# Patient Record
Sex: FEMALE | Race: WHITE | NOT HISPANIC OR LATINO | Employment: FULL TIME | URBAN - METROPOLITAN AREA
[De-identification: names, ages, dates, MRNs, and addresses within clinical notes are randomized per-mention and may not be internally consistent; named-entity substitution may affect disease eponyms.]

---

## 2021-07-07 ENCOUNTER — OFFICE VISIT (OUTPATIENT)
Dept: FAMILY MEDICINE | Facility: CLINIC | Age: 21
End: 2021-07-07
Payer: COMMERCIAL

## 2021-07-07 VITALS
OXYGEN SATURATION: 97 % | BODY MASS INDEX: 41.95 KG/M2 | HEIGHT: 70 IN | TEMPERATURE: 99 F | DIASTOLIC BLOOD PRESSURE: 80 MMHG | HEART RATE: 79 BPM | WEIGHT: 293 LBS | SYSTOLIC BLOOD PRESSURE: 130 MMHG | RESPIRATION RATE: 24 BRPM

## 2021-07-07 DIAGNOSIS — Z00.00 ROUTINE GENERAL MEDICAL EXAMINATION AT A HEALTH CARE FACILITY: Primary | ICD-10-CM

## 2021-07-07 DIAGNOSIS — E66.01 MORBID OBESITY (H): ICD-10-CM

## 2021-07-07 DIAGNOSIS — F41.1 GAD (GENERALIZED ANXIETY DISORDER): ICD-10-CM

## 2021-07-07 DIAGNOSIS — Z30.015 ENCOUNTER FOR INITIAL PRESCRIPTION OF VAGINAL RING HORMONAL CONTRACEPTIVE: ICD-10-CM

## 2021-07-07 DIAGNOSIS — Z12.4 CERVICAL CANCER SCREENING: ICD-10-CM

## 2021-07-07 LAB — HCG UR QL: NEGATIVE

## 2021-07-07 PROCEDURE — 87491 CHLMYD TRACH DNA AMP PROBE: CPT | Performed by: NURSE PRACTITIONER

## 2021-07-07 PROCEDURE — G0145 SCR C/V CYTO,THINLAYER,RESCR: HCPCS | Performed by: NURSE PRACTITIONER

## 2021-07-07 PROCEDURE — 87591 N.GONORRHOEAE DNA AMP PROB: CPT | Performed by: NURSE PRACTITIONER

## 2021-07-07 PROCEDURE — 99385 PREV VISIT NEW AGE 18-39: CPT | Performed by: NURSE PRACTITIONER

## 2021-07-07 PROCEDURE — 99214 OFFICE O/P EST MOD 30 MIN: CPT | Mod: 25 | Performed by: NURSE PRACTITIONER

## 2021-07-07 PROCEDURE — 81025 URINE PREGNANCY TEST: CPT | Performed by: NURSE PRACTITIONER

## 2021-07-07 RX ORDER — ETONOGESTREL AND ETHINYL ESTRADIOL VAGINAL RING .015; .12 MG/D; MG/D
1 RING VAGINAL
Qty: 3 EACH | Refills: 1 | Status: SHIPPED | OUTPATIENT
Start: 2021-07-07 | End: 2021-12-06

## 2021-07-07 RX ORDER — HYDROXYZINE HYDROCHLORIDE 25 MG/1
25-50 TABLET, FILM COATED ORAL 3 TIMES DAILY PRN
Qty: 30 TABLET | Refills: 0 | Status: SHIPPED | OUTPATIENT
Start: 2021-07-07 | End: 2022-11-09

## 2021-07-07 ASSESSMENT — ENCOUNTER SYMPTOMS
FREQUENCY: 0
EYE PAIN: 0
WEAKNESS: 0
COUGH: 0
SHORTNESS OF BREATH: 0
NERVOUS/ANXIOUS: 1
HEMATURIA: 0
FEVER: 0
BREAST MASS: 0
HEADACHES: 0
SORE THROAT: 0
JOINT SWELLING: 0
DYSURIA: 0
DIZZINESS: 0
DIARRHEA: 0
CHILLS: 0
ARTHRALGIAS: 0
CONSTIPATION: 0
HEARTBURN: 0
NAUSEA: 0
PARESTHESIAS: 0
MYALGIAS: 0
ABDOMINAL PAIN: 0
HEMATOCHEZIA: 0
PALPITATIONS: 0

## 2021-07-07 ASSESSMENT — MIFFLIN-ST. JEOR: SCORE: 2284.6

## 2021-07-07 NOTE — PROGRESS NOTES
SUBJECTIVE:   CC: Melany Ibrahim is an 21 year old woman who presents for preventive health visit.       Patient has been advised of split billing requirements and indicates understanding: Yes  Healthy Habits:     Getting at least 3 servings of Calcium per day:  NO    Bi-annual eye exam:  NO    Dental care twice a year:  NO    Sleep apnea or symptoms of sleep apnea:  None    Diet:  Other    Frequency of exercise:  4-5 days/week    Duration of exercise:  30-45 minutes    Taking medications regularly:  Yes    Medication side effects:  None    PHQ-2 Total Score: 1    Additional concerns today:  No    Contraception             Problems with current birth control method: N/A  Method interested in: Nuvo Ring              Methods used previously: ring  Problems with previous methods: no    History of pregnancies:         Patient's last menstrual period was 2021 (approximate).         No results found for: PAP  :    Para:    Menstrual cycle: regular  Flow: light        Breakthrough bleeding occurring? 0 What week of her month? 0  History of migraines: no  Smoker: no  1st degree relative with History of: 0                 Regular self breast exam: no    Accompanying Signs & Symptoms:   Dysuria: no  Vaginal discharge: no  Painful intercourse: no    Precipitating and/or Alleviating factors:    Currently sexually active: YES  Male, Female, both: male  In stable relationship: YES  Desire STD testing: no  Are you planning a pregnancy soon: no                 Body mass index is 46.3 kg/m .  Consider Labs: UPT, STD: Chlamydia/GONORRHEA screen, Pap    Was on Nuvaring and stopped about 6 months ago - didn't get refilled         Today's PHQ-2 Score:   PHQ-2 (  [employer]) 2021   Q1: Little interest or pleasure in doing things 0   Q2: Feeling down, depressed or hopeless 1   PHQ-2 Score 1   Q1: Little interest or pleasure in doing things Not at all   Q2: Feeling down, depressed or hopeless Several days   PHQ-2  Score 1       Anxiety   Noticed more in the last year   Had a lot of change all at once prior to anxiety   Has tried working out, journaling   Usually builds up slowly and then has had panic attack, has had twice  Does not control life or inhibits daily activities       Abuse: Current or Past (Physical, Sexual or Emotional) - No  Do you feel safe in your environment? Yes        Social History     Tobacco Use     Smoking status: Never Smoker     Smokeless tobacco: Never Used   Substance Use Topics     Alcohol use: Yes     Comment: 4 drinks per week     If you drink alcohol do you typically have >3 drinks per day or >7 drinks per week? No    Alcohol Use 7/7/2021   Prescreen: >3 drinks/day or >7 drinks/week? No   Prescreen: >3 drinks/day or >7 drinks/week? -       Reviewed orders with patient.  Reviewed health maintenance and updated orders accordingly - Yes  Lab work is in process  Labs reviewed in EPIC  BP Readings from Last 3 Encounters:   07/07/21 130/80    Wt Readings from Last 3 Encounters:   07/07/21 145.3 kg (320 lb 6.4 oz)                  Patient Active Problem List   Diagnosis     Morbid obesity (H)     History reviewed. No pertinent surgical history.    Social History     Tobacco Use     Smoking status: Never Smoker     Smokeless tobacco: Never Used   Substance Use Topics     Alcohol use: Yes     Comment: 4 drinks per week     Family History   Problem Relation Age of Onset     Diabetes Maternal Grandfather      Diabetes Paternal Grandfather          Current Outpatient Medications   Medication Sig Dispense Refill     etonogestrel-ethinyl estradiol (NUVARING) 0.12-0.015 MG/24HR vaginal ring Place 1 each vaginally every 28 days 3 each 1     hydrOXYzine (ATARAX) 25 MG tablet Take 1-2 tablets (25-50 mg) by mouth 3 times daily as needed for anxiety 30 tablet 0     No Known Allergies    Breast Cancer Screening:        History of abnormal Pap smear: NO - age 21-29 PAP every 3 years recommended     Reviewed and  "updated as needed this visit by clinical staff  Tobacco  Allergies  Meds  Problems  Med Hx  Surg Hx  Fam Hx  Soc Hx          Reviewed and updated as needed this visit by Provider    Meds  Problems            History reviewed. No pertinent past medical history.   History reviewed. No pertinent surgical history.    Review of Systems   Constitutional: Negative for chills and fever.   HENT: Negative for congestion, ear pain, hearing loss and sore throat.    Eyes: Negative for pain and visual disturbance.   Respiratory: Negative for cough and shortness of breath.    Cardiovascular: Negative for chest pain, palpitations and peripheral edema.   Gastrointestinal: Negative for abdominal pain, constipation, diarrhea, heartburn, hematochezia and nausea.   Breasts:  Negative for tenderness, breast mass and discharge.   Genitourinary: Negative for dysuria, frequency, genital sores, hematuria, pelvic pain, urgency, vaginal bleeding and vaginal discharge.   Musculoskeletal: Negative for arthralgias, joint swelling and myalgias.   Skin: Negative for rash.   Neurological: Negative for dizziness, weakness, headaches and paresthesias.   Psychiatric/Behavioral: Negative for mood changes. The patient is nervous/anxious.        OBJECTIVE:   /80   Pulse 79   Temp 99  F (37.2  C)   Resp 24   Ht 1.772 m (5' 9.75\")   Wt 145.3 kg (320 lb 6.4 oz)   LMP 06/28/2021 (Approximate)   SpO2 97%   Breastfeeding No   BMI 46.30 kg/m    Physical Exam  GENERAL: healthy, alert and no distress  EYES: Eyes grossly normal to inspection, PERRL and conjunctivae and sclerae normal  HENT: ear canals and TM's normal, nose and mouth without ulcers or lesions  NECK: no adenopathy, no asymmetry, masses, or scars and thyroid normal to palpation  RESP: lungs clear to auscultation - no rales, rhonchi or wheezes  BREAST: normal without masses, tenderness or nipple discharge and no palpable axillary masses or adenopathy  CV: regular rate and " rhythm, normal S1 S2, no S3 or S4, no murmur, click or rub, no peripheral edema and peripheral pulses strong  ABDOMEN: soft, nontender, no hepatosplenomegaly, no masses and bowel sounds normal   (female): normal female external genitalia, normal urethral meatus, vaginal mucosa pink, moist, well rugated, and normal cervix/adnexa/uterus without masses or discharge  MS: no gross musculoskeletal defects noted, no edema  SKIN: no suspicious lesions or rashes  NEURO: Normal strength and tone, mentation intact and speech normal  PSYCH: mentation appears normal, affect normal/bright    Diagnostic Test Results:  Pending     ASSESSMENT/PLAN:   1. Routine general medical examination at a health care facility    - NEISSERIA GONORRHOEA PCR  - CHLAMYDIA TRACHOMATIS PCR    2. Encounter for initial prescription of vaginal ring hormonal contraceptive  Has been on NuvaRing in the past, tolerated well and would like to go back on.  Urine pregnancy negative.  NuvaRing reordered, follow-up in 6 months.  - HCG Qual, Urine ([id number])  - etonogestrel-ethinyl estradiol (NUVARING) 0.12-0.015 MG/24HR vaginal ring; Place 1 each vaginally every 28 days  Dispense: 3 each; Refill: 1    3. Morbid obesity (H)  Chronic, patient currently working with nutritionist and meal planning.  Encouraged to continue to increase daily physical activity as tolerated.    4. ARIEL (generalized anxiety disorder)  Acute on chronic, has been worsening over the last year.  Had a lot of significant changes all at once, improving situation, however still having anxiety.  Discussed counseling, patient would like to pursue.  Referral placed, should be receiving a phone call to schedule.  Other counseling resources attached.  Also discussed hydroxyzine as needed for anxiety, can try 1 to 2 tablets as needed for panic or anxiety.  Advised not to take if driving or operating heavy machinery.  He can take at night as needed if having difficulty sleeping.  - hydrOXYzine  "(ATARAX) 25 MG tablet; Take 1-2 tablets (25-50 mg) by mouth 3 times daily as needed for anxiety  Dispense: 30 tablet; Refill: 0  - MENTAL HEALTH REFERRAL  - Adult; Outpatient Treatment; Individual/Couples/Family/Group Therapy/Health Psychology; [facility] [place]: [facility] [phone]; We will contact you to schedule the appointment or please call with any questions    5. Cervical cancer screening  Screening.  - Pap imaged thin layer screen only - recommended age 21 - 24 years    Patient has been advised of split billing requirements and indicates understanding: Yes  COUNSELING:  Reviewed preventive health counseling, as reflected in patient instructions    Estimated body mass index is 46.3 kg/m  as calculated from the following:    Height as of this encounter: 1.772 m (5' 9.75\").    Weight as of this encounter: 145.3 kg (320 lb 6.4 oz).    Weight management plan: Discussed healthy diet and exercise guidelines    She reports that she has never smoked. She has never used smokeless tobacco.      Counseling Resources:  ATP IV Guidelines  Pooled Cohorts Equation Calculator  Breast Cancer Risk Calculator  BRCA-Related Cancer Risk Assessment: FHS-7 Tool  FRAX Risk Assessment  ICSI Preventive Guidelines  Dietary Guidelines for Americans, 2010  USDA's MyPlate  ASA Prophylaxis  Lung CA Screening    MARIELENA [facility]  "

## 2021-07-07 NOTE — PATIENT INSTRUCTIONS
1. Routine general medical examination at a health care facility    - NEISSERIA GONORRHOEA PCR  - CHLAMYDIA TRACHOMATIS PCR    2. Encounter for initial prescription of vaginal ring hormonal contraceptive  Has been on NuvaRing in the past, tolerated well and would like to go back on.  Urine pregnancy negative.  NuvaRing reordered, follow-up in 6 months.  - HCG Qual, Urine ([id number])  - etonogestrel-ethinyl estradiol (NUVARING) 0.12-0.015 MG/24HR vaginal ring; Place 1 each vaginally every 28 days  Dispense: 3 each; Refill: 1    3. Morbid obesity (H)  Chronic, patient currently working with nutritionist and meal planning.  Encouraged to continue to increase daily physical activity as tolerated.    4. ARIEL (generalized anxiety disorder)  Acute on chronic, has been worsening over the last year.  Had a lot of significant changes all at once, improving situation, however still having anxiety.  Discussed counseling, patient would like to pursue.  Referral placed, should be receiving a phone call to schedule.  Other counseling resources attached.  Also discussed hydroxyzine as needed for anxiety, can try 1 to 2 tablets as needed for panic or anxiety.  Advised not to take if driving or operating heavy machinery.  He can take at night as needed if having difficulty sleeping.  - hydrOXYzine (ATARAX) 25 MG tablet; Take 1-2 tablets (25-50 mg) by mouth 3 times daily as needed for anxiety  Dispense: 30 tablet; Refill: 0  - MENTAL HEALTH REFERRAL  - Adult; Outpatient Treatment; Individual/Couples/Family/Group Therapy/Health Psychology; [facility] [place] [facility] [phone]; We will contact you to schedule the appointment or please call with any questions    5. Cervical cancer screening  Screening.  - Pap imaged thin layer screen only - recommended age 21 - 24 years    Other counseling resources  [facility] ([place])-- [phone]  [facility] ([place]) -- [phone]   of [place] ([facility])  -- [phone]  [facility] ([place]) -- [phone]  [facility] ([place], other cities as well) -- [phone]  [facility] ([place]) -- [phone]  [facility] ([place], multiple locations) -- [phone]  [facility] ([facility], multiple locations) -- [phone]       Preventive Health Recommendations  Female Ages 21 to 25     Yearly exam:     See your health care provider every year in order to  o Review health changes.   o Discuss preventive care.    o Review your medicines if your doctor has prescribed any.      You should be tested each year for STDs (sexually transmitted diseases).       Talk to your provider about how often you should have cholesterol testing.      Get a Pap test every three years. If you have an abnormal result, your doctor may have you test more often.      If you are at risk for diabetes, you should have a diabetes test (fasting glucose).     Shots:     Get a flu shot each year.     Get a tetanus shot every 10 years.     Consider getting the shot (vaccine) that prevents cervical cancer (Gardasil).    Nutrition:     Eat at least 5 servings of fruits and vegetables each day.    Eat whole-grain bread, whole-wheat pasta and brown rice instead of white grains and rice.    Get adequate Calcium and Vitamin D.     Lifestyle    Exercise at least 150 minutes a week each week (30 minutes a day, 5 days a week). This will help you control your weight and prevent disease.    Limit alcohol to one drink per day.    No smoking.     Wear sunscreen to prevent skin cancer.    See your dentist every six months for an exam and cleaning.

## 2021-07-08 LAB
C TRACH DNA SPEC QL NAA+PROBE: NEGATIVE
N GONORRHOEA DNA SPEC QL NAA+PROBE: NEGATIVE
SPECIMEN SOURCE: NORMAL
SPECIMEN SOURCE: NORMAL

## 2021-07-09 LAB
COPATH REPORT: NORMAL
PAP: NORMAL

## 2021-09-02 ENCOUNTER — OFFICE VISIT (OUTPATIENT)
Dept: FAMILY MEDICINE | Facility: CLINIC | Age: 21
End: 2021-09-02
Payer: COMMERCIAL

## 2021-09-02 VITALS
BODY MASS INDEX: 47.52 KG/M2 | DIASTOLIC BLOOD PRESSURE: 80 MMHG | SYSTOLIC BLOOD PRESSURE: 120 MMHG | WEIGHT: 293 LBS | RESPIRATION RATE: 24 BRPM | HEART RATE: 110 BPM | TEMPERATURE: 99.1 F | OXYGEN SATURATION: 97 %

## 2021-09-02 DIAGNOSIS — L98.9 SKIN LESION: Primary | ICD-10-CM

## 2021-09-02 PROCEDURE — 99213 OFFICE O/P EST LOW 20 MIN: CPT | Performed by: FAMILY MEDICINE

## 2021-09-02 NOTE — PROGRESS NOTES
Assessment & Plan     Skin lesion  Differentials discussed in detail including benign etiology.  Dermatology referral placed for considering excision.  All questions answered.  - Adult Dermatology Referral; Future      Jose Holbrook MD  [facility]    Lexus Mosley is a 21 year old who presents for the following health issues     HPI       Derm problem      Duration: 3 months, growing in size    Description (location/character/radiation): left arm    Intensity:  mild    Accompanying signs and symptoms: small bump growing in size    History (similar episodes/previous evaluation): None    Precipitating or alleviating factors: None    Therapies tried and outcome: None         Review of Systems   Constitutional, HEENT, cardiovascular, pulmonary, gi and gu systems are negative, except as otherwise noted.      Objective    /80   Pulse 110   Temp 99.1  F (37.3  C)   Resp 24   Wt 149.1 kg (328 lb 12.8 oz)   LMP  (LMP Unknown)   SpO2 97%   Breastfeeding No   BMI 47.52 kg/m    Body mass index is 47.52 kg/m .  Physical Exam   GENERAL: alert and no distress  MS: no gross musculoskeletal defects noted, no edema  SKIN: small subcutaneous localized swelling involving left forearm, firm on palpation, nontender without any erythema or warmth  NEURO: Normal strength and tone, mentation intact and speech normal  PSYCH: mentation appears normal, affect normal/bright

## 2021-10-11 ENCOUNTER — TELEPHONE (OUTPATIENT)
Dept: PSYCHOLOGY | Facility: CLINIC | Age: 21
End: 2021-10-11

## 2021-10-11 NOTE — TELEPHONE ENCOUNTER
Therapist attempted to contact pt to inquire about where she would like the link sent for video visit, but she did not answer. Therapist sent link to email and phone and [facility] for pt informing her of this and letting her know the video would remain available for another 10 minutes. Therapist provided [employer] phone number for any questions or inquiries regarding her visit.    Lupe Brown, LICSW 10/11/2021

## 2021-12-05 DIAGNOSIS — Z30.015 ENCOUNTER FOR INITIAL PRESCRIPTION OF VAGINAL RING HORMONAL CONTRACEPTIVE: ICD-10-CM

## 2021-12-06 RX ORDER — ETONOGESTREL AND ETHINYL ESTRADIOL VAGINAL RING .015; .12 MG/D; MG/D
1 RING VAGINAL
Qty: 3 EACH | Refills: 1 | Status: SHIPPED | OUTPATIENT
Start: 2021-12-06 | End: 2022-06-01

## 2022-01-03 ENCOUNTER — OFFICE VISIT (OUTPATIENT)
Dept: DERMATOLOGY | Facility: CLINIC | Age: 22
End: 2022-01-03
Attending: FAMILY MEDICINE
Payer: COMMERCIAL

## 2022-01-03 VITALS — OXYGEN SATURATION: 98 % | HEART RATE: 103 BPM | DIASTOLIC BLOOD PRESSURE: 80 MMHG | SYSTOLIC BLOOD PRESSURE: 156 MMHG

## 2022-01-03 DIAGNOSIS — L72.9 CYST OF SKIN: Primary | ICD-10-CM

## 2022-01-03 PROCEDURE — 99202 OFFICE O/P NEW SF 15 MIN: CPT | Performed by: PHYSICIAN ASSISTANT

## 2022-01-03 NOTE — PROGRESS NOTES
HPI:   Chief complaints: Melany Ibrahim is a pleasant 21 year old female who presents for evaluation of a bump on the left arm. The bump has been present for a few months and will sometimes be bothersome. It's not painful at this point.       PHYSICAL EXAM:    BP (!) 156/80   Pulse 103   SpO2 98%   Skin exam performed as follows: Type 2 skin. Mood appropriate  Alert and Oriented X 3. Well developed, well nourished in no distress.  General appearance: Normal  Head including face: Normal  Eyes: conjunctiva and lids: Normal  Mouth: Lips, teeth, gums: Normal  Neck: Normal  Cardiovascular: Exam of peripheral vascular system by observation for swelling, varicosities, edema: Normal  Right upper extremity: Normal  Left upper extremity: Normal  Right lower extremity: Normal  Left lower extremity: Normal  Skin: Scalp and body hair: See below    6 mm smooth mobile subcutaneous nodule on the left extensor forearm    ASSESSMENT/PLAN:     1. Cyst on the left forearm. Discussed excision with Dr Sykes if it becomes bothersome.   2. Melany to follow up with Primary Care provider regarding elevated blood pressure.        Follow-up: PRN  CC:   Scribed By: Shavon Britton, MS, PA-C

## 2022-01-03 NOTE — LETTER
1/3/2022         RE: Melany Ibrahim  [address] [place] [address] [place] [address]        Dear Colleague,    Thank you for referring your patient, Melany Ibrahim, to the [facility]. Please see a copy of my visit note below.    HPI:   Chief complaints: Melany Ibrahim is a pleasant 21 year old female who presents for evaluation of a bump on the left arm. The bump has been present for a few months and will sometimes be bothersome. It's not painful at this point.       PHYSICAL EXAM:    BP (!) 156/80   Pulse 103   SpO2 98%   Skin exam performed as follows: Type 2 skin. Mood appropriate  Alert and Oriented X 3. Well developed, well nourished in no distress.  General appearance: Normal  Head including face: Normal  Eyes: conjunctiva and lids: Normal  Mouth: Lips, teeth, gums: Normal  Neck: Normal  Cardiovascular: Exam of peripheral vascular system by observation for swelling, varicosities, edema: Normal  Right upper extremity: Normal  Left upper extremity: Normal  Right lower extremity: Normal  Left lower extremity: Normal  Skin: Scalp and body hair: See below    6 mm smooth mobile subcutaneous nodule on the left extensor forearm    ASSESSMENT/PLAN:     1. Cyst on the left forearm. Discussed excision with Dr Sykes if it becomes bothersome.   2. Melany to follow up with Primary Care provider regarding elevated blood pressure.        Follow-up: PRN  CC:   Scribed By: Shavon Abdul, MS, PAPARAM          Again, thank you for allowing me to participate in the care of your patient.        Sincerely,        Shavon Abdul PA-C

## 2022-02-06 ENCOUNTER — HEALTH MAINTENANCE LETTER (OUTPATIENT)
Age: 22
End: 2022-02-06

## 2022-05-30 DIAGNOSIS — Z30.015 ENCOUNTER FOR INITIAL PRESCRIPTION OF VAGINAL RING HORMONAL CONTRACEPTIVE: ICD-10-CM

## 2022-06-01 RX ORDER — ETONOGESTREL AND ETHINYL ESTRADIOL .12; .015 MG/D; MG/D
RING VAGINAL
Qty: 1 EACH | Refills: 1 | Status: SHIPPED | OUTPATIENT
Start: 2022-06-01 | End: 2022-11-09

## 2022-10-03 ENCOUNTER — HEALTH MAINTENANCE LETTER (OUTPATIENT)
Age: 22
End: 2022-10-03

## 2022-11-02 ASSESSMENT — ANXIETY QUESTIONNAIRES
GAD7 TOTAL SCORE: 11
GAD7 TOTAL SCORE: 11
8. IF YOU CHECKED OFF ANY PROBLEMS, HOW DIFFICULT HAVE THESE MADE IT FOR YOU TO DO YOUR WORK, TAKE CARE OF THINGS AT HOME, OR GET ALONG WITH OTHER PEOPLE?: SOMEWHAT DIFFICULT
6. BECOMING EASILY ANNOYED OR IRRITABLE: NEARLY EVERY DAY
GAD7 TOTAL SCORE: 11
3. WORRYING TOO MUCH ABOUT DIFFERENT THINGS: MORE THAN HALF THE DAYS
IF YOU CHECKED OFF ANY PROBLEMS ON THIS QUESTIONNAIRE, HOW DIFFICULT HAVE THESE PROBLEMS MADE IT FOR YOU TO DO YOUR WORK, TAKE CARE OF THINGS AT HOME, OR GET ALONG WITH OTHER PEOPLE: SOMEWHAT DIFFICULT
4. TROUBLE RELAXING: NOT AT ALL
5. BEING SO RESTLESS THAT IT IS HARD TO SIT STILL: NOT AT ALL
1. FEELING NERVOUS, ANXIOUS, OR ON EDGE: MORE THAN HALF THE DAYS
7. FEELING AFRAID AS IF SOMETHING AWFUL MIGHT HAPPEN: MORE THAN HALF THE DAYS
7. FEELING AFRAID AS IF SOMETHING AWFUL MIGHT HAPPEN: MORE THAN HALF THE DAYS
2. NOT BEING ABLE TO STOP OR CONTROL WORRYING: MORE THAN HALF THE DAYS

## 2022-11-02 ASSESSMENT — PATIENT HEALTH QUESTIONNAIRE - PHQ9
10. IF YOU CHECKED OFF ANY PROBLEMS, HOW DIFFICULT HAVE THESE PROBLEMS MADE IT FOR YOU TO DO YOUR WORK, TAKE CARE OF THINGS AT HOME, OR GET ALONG WITH OTHER PEOPLE: SOMEWHAT DIFFICULT
SUM OF ALL RESPONSES TO PHQ QUESTIONS 1-9: 11
SUM OF ALL RESPONSES TO PHQ QUESTIONS 1-9: 11

## 2022-11-09 ENCOUNTER — OFFICE VISIT (OUTPATIENT)
Dept: FAMILY MEDICINE | Facility: CLINIC | Age: 22
End: 2022-11-09
Payer: COMMERCIAL

## 2022-11-09 VITALS
WEIGHT: 293 LBS | TEMPERATURE: 98.5 F | HEIGHT: 70 IN | RESPIRATION RATE: 22 BRPM | BODY MASS INDEX: 41.95 KG/M2 | HEART RATE: 106 BPM | SYSTOLIC BLOOD PRESSURE: 150 MMHG | DIASTOLIC BLOOD PRESSURE: 68 MMHG | OXYGEN SATURATION: 98 %

## 2022-11-09 DIAGNOSIS — F41.1 GAD (GENERALIZED ANXIETY DISORDER): ICD-10-CM

## 2022-11-09 DIAGNOSIS — E66.01 MORBID OBESITY (H): Primary | ICD-10-CM

## 2022-11-09 DIAGNOSIS — E66.01 MORBID OBESITY (H): ICD-10-CM

## 2022-11-09 PROCEDURE — 99214 OFFICE O/P EST MOD 30 MIN: CPT | Performed by: FAMILY MEDICINE

## 2022-11-09 RX ORDER — FLUOXETINE 10 MG/1
10 CAPSULE ORAL DAILY
Qty: 60 CAPSULE | Refills: 1 | Status: SHIPPED | OUTPATIENT
Start: 2022-11-09 | End: 2022-12-03

## 2022-11-09 ASSESSMENT — PAIN SCALES - GENERAL: PAINLEVEL: NO PAIN (0)

## 2022-11-09 ASSESSMENT — PATIENT HEALTH QUESTIONNAIRE - PHQ9
10. IF YOU CHECKED OFF ANY PROBLEMS, HOW DIFFICULT HAVE THESE PROBLEMS MADE IT FOR YOU TO DO YOUR WORK, TAKE CARE OF THINGS AT HOME, OR GET ALONG WITH OTHER PEOPLE: SOMEWHAT DIFFICULT
SUM OF ALL RESPONSES TO PHQ QUESTIONS 1-9: 11

## 2022-11-09 ASSESSMENT — ANXIETY QUESTIONNAIRES: GAD7 TOTAL SCORE: 11

## 2022-11-09 NOTE — PROGRESS NOTES
"  Assessment & Plan     Morbid obesity (H)  Known to have morbid obesity, would like to start Mounjaro for weight loss, friend has great success with that medicine.  Management options discussed in detail.  Mounjaro prescribed, common side effects discussed.  Recommended healthy diet, limiting calories, regular exercise and nutritionist referral placed as well.  Follow-up in 3 months or earlier if needed  - Tirzepatide 2.5 MG/0.5ML SOPN; Inject 2.5 mg Subcutaneous once a week for 28 days, THEN 5 mg once a week for 56 days.  - Nutrition Referral; Future    ARIEL (generalized anxiety disorder)  Known to have generalized anxiety, depression.  Treatment options discussed.  Prozac prescribed, common side effects discussed.  Lifestyle modifications stressed including engaging in healthy activities including regular exercise.  Follow-up in 3 months or earlier if needed.  - FLUoxetine (PROZAC) 10 MG capsule; Take 1 capsule (10 mg) by mouth daily         BMI:   Estimated body mass index is 51.97 kg/m  as calculated from the following:    Height as of this encounter: 1.772 m (5' 9.75\").    Weight as of this encounter: 163.1 kg (359 lb 9.6 oz).   Weight management plan: Discussed healthy diet and exercise guidelines    Depression Screening Follow Up    PHQ 11/2/2022   PHQ-9 Total Score 11   Q9: Thoughts of better off dead/self-harm past 2 weeks Not at all       Jose Holbrook MD  [facility]    Lexus Mosley is a 22 year old, presenting for the following health issues:  Weight Loss (Would like to discuss medication for weight loss), Anxiety, and Depression      History of Present Illness       Mental Health Follow-up:  Patient presents to follow-up on Depression & Anxiety.Patient's depression since last visit has been:  Worse  The patient is not having other symptoms associated with depression.  Patient's anxiety since last visit has been:  No change  The patient is not having other symptoms " "associated with anxiety.  Any significant life events: No  Patient is feeling anxious or having panic attacks.  Patient has no concerns about alcohol or drug use.    Reason for visit:  Weightloss recomendation (interested in mounjaro for weightloss)    She eats 2-3 servings of fruits and vegetables daily.She consumes 0 sweetened beverage(s) daily.She exercises with enough effort to increase her heart rate 10 to 19 minutes per day.  She exercises with enough effort to increase her heart rate 3 or less days per week.   She is taking medications regularly.    Today's PHQ-9         PHQ-9 Total Score: 11    PHQ-9 Q9 Thoughts of better off dead/self-harm past 2 weeks :   Not at all    How difficult have these problems made it for you to do your work, take care of things at home, or get along with other people: Somewhat difficult  Today's ARIEL-7 Score: 11      Review of Systems   Constitutional, HEENT, cardiovascular, pulmonary, GI, , musculoskeletal, neuro, skin, endocrine and psych systems are negative, except as otherwise noted.      Objective    BP (!) 150/68 (BP Location: Right arm, Patient Position: Sitting, Cuff Size: Adult Large)   Pulse 106   Temp 98.5  F (36.9  C) (Tympanic)   Resp 22   Ht 1.772 m (5' 9.75\")   Wt (!) 163.1 kg (359 lb 9.6 oz)   SpO2 98%   BMI 51.97 kg/m    Body mass index is 51.97 kg/m .  Physical Exam   GENERAL: alert, no distress and obese  EYES: Eyes grossly normal to inspection, PERRL and conjunctivae and sclerae normal  NECK: no adenopathy, no asymmetry, masses, or scars and thyroid normal to palpation  RESP: lungs clear to auscultation - no rales, rhonchi or wheezes  CV: regular rate and rhythm, normal S1 S2, no S3 or S4, no murmur, click or rub, no peripheral edema and peripheral pulses strong  ABDOMEN: soft, nontender, no hepatosplenomegaly, no masses and bowel sounds normal  MS: no gross musculoskeletal defects noted, no edema  SKIN: no suspicious lesions or rashes  NEURO: Normal " strength and tone, mentation intact and speech normal  PSYCH: mentation appears normal, anxious, judgement and insight intact and appearance well groomed

## 2022-11-10 RX ORDER — SEMAGLUTIDE 1.34 MG/ML
INJECTION, SOLUTION SUBCUTANEOUS
Qty: 30 ML | Refills: 0 | Status: SHIPPED | OUTPATIENT
Start: 2022-11-10 | End: 2022-12-07

## 2022-12-02 DIAGNOSIS — F41.1 GAD (GENERALIZED ANXIETY DISORDER): ICD-10-CM

## 2022-12-02 NOTE — TELEPHONE ENCOUNTER
[facility] called asking about  tirzepatide prescription. Per chart, it was ordered on 11/9/2022, discontinued on 11/10/2022, ozempic ordered on 11/10. Pt was called, she  is not aware of reason for medication change. Pt needs order for tirzepatide 5 mg once a week for 56 days. She is due for her next injection on 12/8/2022. Message forwarded to Dr Holbrook for clarification along with pharmacy request for fluoxetine 90 days prescription.   Mamie Chu RN

## 2022-12-03 RX ORDER — FLUOXETINE 10 MG/1
CAPSULE ORAL
Qty: 90 CAPSULE | Refills: 2 | Status: SHIPPED | OUTPATIENT
Start: 2022-12-03

## 2022-12-06 NOTE — TELEPHONE ENCOUNTER
Patient calling to confirm the dose is 0.5 MG. Patient was told by pharmacy that she can not have a dose change with in the prescription period.

## 2022-12-07 DIAGNOSIS — E66.01 MORBID OBESITY (H): Primary | ICD-10-CM

## 2022-12-07 NOTE — TELEPHONE ENCOUNTER
Pt is not going through insurance as they won't pay for medication, she is using [employer] coupon. She needs a new prescription for tirzepatide 5 mg once a week for 2 months sent to [facility] in [facility]. Mamie Chu RN

## 2023-01-03 DIAGNOSIS — E66.01 MORBID OBESITY (H): ICD-10-CM

## 2023-01-03 RX ORDER — TIRZEPATIDE 5 MG/.5ML
INJECTION, SOLUTION SUBCUTANEOUS
Qty: 0.5 ML | Refills: 1 | Status: SHIPPED | OUTPATIENT
Start: 2023-01-03 | End: 2023-02-20

## 2023-02-16 ENCOUNTER — VIRTUAL VISIT (OUTPATIENT)
Dept: FAMILY MEDICINE | Facility: CLINIC | Age: 23
End: 2023-02-16
Payer: COMMERCIAL

## 2023-02-16 DIAGNOSIS — E66.01 MORBID OBESITY (H): Primary | ICD-10-CM

## 2023-02-16 DIAGNOSIS — E66.01 MORBID OBESITY (H): ICD-10-CM

## 2023-02-16 PROCEDURE — 99213 OFFICE O/P EST LOW 20 MIN: CPT | Mod: VID | Performed by: FAMILY MEDICINE

## 2023-02-16 RX ORDER — TIRZEPATIDE 10 MG/.5ML
10 INJECTION, SOLUTION SUBCUTANEOUS
Qty: 6 ML | Refills: 1 | Status: SHIPPED | OUTPATIENT
Start: 2023-02-16 | End: 2023-02-20

## 2023-02-16 NOTE — PROGRESS NOTES
Melany is a 23 year old who is being evaluated via a billable video visit.      How would you like to obtain your AVS? MyChart  If the video visit is dropped, the invitation should be resent by: Text to cell phone: [phone]  Will anyone else be joining your video visit? No        Assessment & Plan     Morbid obesity (H)  Known to have morbid obesity, Mounjaro was started 2 months ago, lost about 10 pounds since then, no significant side effect.  Management options discussed.  Will increase Mounjaro to 10 mg weekly.  Recommended to continue regular exercise, healthy diet and follow-up in 3 months or earlier if needed.  - tirzepatide (MOUNJARO) 10 MG/0.5ML pen; Inject 10 mg Subcutaneous every 7 days      Jose Holbrook MD  [facility]   Melany is a 23 year old, presenting for the following health issues:  Weight Problem      HPI     Medication Followup of Lakeisha     Taking Medication as prescribed: yes    Side Effects:  Heartburn and constipation     Medication Helping Symptoms:  Yes- is working but would like to increase the dose now.     Current weight 351.2     Wt Readings from Last 2 Encounters:   11/09/22 (!) 163.1 kg (359 lb 9.6 oz)   09/02/21 149.1 kg (328 lb 12.8 oz)           Review of Systems   Constitutional, HEENT, cardiovascular, pulmonary, gi and gu systems are negative, except as otherwise noted.      Objective    Vitals - Patient Reported  Weight (Patient Reported): 159.3 kg (351 lb 3.2 oz)      Vitals:  No vitals were obtained today due to virtual visit.    Physical Exam   GENERAL: alert and no distress  EYES: Eyes grossly normal to inspection.  No discharge or erythema, or obvious scleral/conjunctival abnormalities.  RESP: No audible wheeze, cough, or visible cyanosis.  No visible retractions or increased work of breathing.    SKIN: Visible skin clear. No significant rash, abnormal pigmentation or lesions.  NEURO: Cranial nerves grossly intact.   Mentation and speech appropriate for age.  PSYCH: Mentation appears normal, affect normal/bright, judgement and insight intact, normal speech and appearance well-groomed.      Video-Visit Details    Type of service:  Video Visit     Originating Location (pt. Location): Home    Distant Location (provider location):  On-site  Platform used for Video Visit: Jacky

## 2023-02-20 RX ORDER — SEMAGLUTIDE 1.34 MG/ML
INJECTION, SOLUTION SUBCUTANEOUS
Qty: 12 ML | Refills: 1 | Status: SHIPPED | OUTPATIENT
Start: 2023-02-20 | End: 2023-02-21

## 2023-02-20 NOTE — TELEPHONE ENCOUNTER
Patient called back. Asking for Mounjaro instead of ozempic. Patient states she has a coupon for Mounjaro that makes it affordable for her to pay out of pocket. Patient is requesting a 3 month supply     Preferred Pharmacy:  [facility] [address] [place] [address] [address]  Phone: [phone] Fax: [phone]      Could we send this information to you in [facility] or would you prefer to receive a phone call?:   Patient would prefer a phone call   Okay to leave a detailed message?: Yes at Home number on file [phone] (home)

## 2023-02-20 NOTE — TELEPHONE ENCOUNTER
Spoke with patient. Patient was aware the Monjaro wasn't covered by insurance. Patient states she was paying Out of pocket with a coupon. Patient will call pharmacy to see if theres an out of pocket cost to ozempic or if she would like to switch back to monjaro

## 2023-02-21 RX ORDER — TIRZEPATIDE 10 MG/.5ML
10 INJECTION, SOLUTION SUBCUTANEOUS
Qty: 12 ML | Refills: 1 | Status: SHIPPED | OUTPATIENT
Start: 2023-02-21

## 2023-10-21 ENCOUNTER — HEALTH MAINTENANCE LETTER (OUTPATIENT)
Age: 23
End: 2023-10-21

## 2024-02-03 ENCOUNTER — OFFICE VISIT (OUTPATIENT)
Dept: URGENT CARE | Facility: URGENT CARE | Age: 24
End: 2024-02-03
Payer: COMMERCIAL

## 2024-02-03 VITALS
TEMPERATURE: 98.3 F | OXYGEN SATURATION: 98 % | SYSTOLIC BLOOD PRESSURE: 137 MMHG | WEIGHT: 292 LBS | BODY MASS INDEX: 42.2 KG/M2 | HEART RATE: 79 BPM | DIASTOLIC BLOOD PRESSURE: 79 MMHG | RESPIRATION RATE: 16 BRPM

## 2024-02-03 DIAGNOSIS — K13.0 RASH ON LIPS: Primary | ICD-10-CM

## 2024-02-03 DIAGNOSIS — B00.89 HERPETIC DERMATITIS: ICD-10-CM

## 2024-02-03 PROCEDURE — 87252 VIRUS INOCULATION TISSUE: CPT | Mod: 90 | Performed by: NURSE PRACTITIONER

## 2024-02-03 PROCEDURE — 86696 HERPES SIMPLEX TYPE 2 TEST: CPT | Performed by: NURSE PRACTITIONER

## 2024-02-03 PROCEDURE — 36415 COLL VENOUS BLD VENIPUNCTURE: CPT | Performed by: NURSE PRACTITIONER

## 2024-02-03 PROCEDURE — 86695 HERPES SIMPLEX TYPE 1 TEST: CPT | Performed by: NURSE PRACTITIONER

## 2024-02-03 PROCEDURE — 99203 OFFICE O/P NEW LOW 30 MIN: CPT | Performed by: NURSE PRACTITIONER

## 2024-02-03 PROCEDURE — 99000 SPECIMEN HANDLING OFFICE-LAB: CPT | Performed by: NURSE PRACTITIONER

## 2024-02-03 RX ORDER — ACYCLOVIR 400 MG/1
400 TABLET ORAL EVERY 8 HOURS
Qty: 30 TABLET | Refills: 0 | Status: SHIPPED | OUTPATIENT
Start: 2024-02-03 | End: 2024-02-13

## 2024-02-03 NOTE — PROGRESS NOTES
SUBJECTIVE:   Melany Ibrahim is a 24 year old female presenting with a chief complaint of   Chief Complaint   Patient presents with    Derm Problem     In the beginning stages of rash on lower lip, little bumps, then gets very swollen and puffy (pt has photo of previous rash), seems to happen ever 3 months.  Pt has been using Aquaphor for comfort.     Pt states she can feel the rash coming on, like the follicles are becoming inflamed. The lips swell and have gotten crusty and eventually the rash goes away. This has been recurring every three months it seems. Pt denies new soaps, lotions, chapstick/lipstick, no new meds.     No past medical history on file.  Family History   Problem Relation Age of Onset    Diabetes Maternal Grandfather     Diabetes Paternal Grandfather      Current Outpatient Medications   Medication Sig Dispense Refill    FLUoxetine (PROZAC) 10 MG capsule TAKE 1 CAPSULE BY MOUTH EVERY DAY (Patient not taking: Reported on 2/16/2023) 90 capsule 2    tirzepatide (MOUNJARO) 10 MG/0.5ML pen Inject 10 mg Subcutaneous every 7 days (Patient not taking: Reported on 2/3/2024) 12 mL 1     Social History     Tobacco Use    Smoking status: Never    Smokeless tobacco: Never   Substance Use Topics    Alcohol use: Yes     Comment: occ on the weekends       OBJECTIVE  /79   Pulse 79   Temp 98.3  F (36.8  C) (Tympanic)   Resp 16   Wt 132.5 kg (292 lb)   SpO2 98%   BMI 42.20 kg/m      Physical Exam  Constitutional:       Appearance: Normal appearance.   HENT:      Mouth/Throat:        Comments: Pinpoint clear/flesh colored, raised lesions to both upper and lower lip along the lip margin. Skin beyond [place] border is slightly red.  Neurological:      Mental Status: She is alert.       HSV type 1 &2 IGG; pending  Viral culture: pending    ASSESSMENT:    1. Rash on lips    - Adult Dermatology  Referral; Future  - Herpes Simplex Virus 1 and 2 IgG; Future  - Viral Culture Non-respiratory;  Future  - Viral Culture Non-respiratory  - Herpes Simplex Virus 1 and 2 IgG    2. Herpetic dermatitis    - acyclovir (ZOVIRAX) 400 MG tablet; Take 1 tablet (400 mg) by mouth every 8 hours for 10 days  Dispense: 30 tablet; Refill: 0  - Viral Culture Non-respiratory; Future  - Viral Culture Non-respiratory      PLAN:  Take the acyclovir as directed.   If this is herpetic in nature it will prevent the lips from getting larger and more painful.  We will call you if the results return positive.  If the results are negative and the rash is not improving with the oral medications please follow up with dermatology. Referral placed today.

## 2024-02-03 NOTE — PATIENT INSTRUCTIONS
Take the acyclovir as directed.   If this is herpetic in nature it will prevent the lips from getting larger and more painful.  We will call you if the results return positive.  If the results are negative and the rash is not improving with the oral medications please follow up with dermatology. Referral placed today.

## 2024-02-05 LAB
HSV1 IGG SERPL QL IA: 0.16 INDEX
HSV1 IGG SERPL QL IA: NORMAL
HSV2 IGG SERPL QL IA: 0.04 INDEX
HSV2 IGG SERPL QL IA: NORMAL

## 2024-12-14 ENCOUNTER — HEALTH MAINTENANCE LETTER (OUTPATIENT)
Age: 24
End: 2024-12-14

## 2025-04-15 ENCOUNTER — OFFICE VISIT (OUTPATIENT)
Dept: FAMILY MEDICINE | Facility: CLINIC | Age: 25
End: 2025-04-15
Payer: COMMERCIAL

## 2025-04-15 VITALS
SYSTOLIC BLOOD PRESSURE: 102 MMHG | BODY MASS INDEX: 41.95 KG/M2 | RESPIRATION RATE: 16 BRPM | TEMPERATURE: 98.4 F | HEART RATE: 86 BPM | WEIGHT: 293 LBS | OXYGEN SATURATION: 98 % | DIASTOLIC BLOOD PRESSURE: 82 MMHG | HEIGHT: 70 IN

## 2025-04-15 DIAGNOSIS — E66.01 MORBID OBESITY (H): Primary | ICD-10-CM

## 2025-04-15 PROCEDURE — 3079F DIAST BP 80-89 MM HG: CPT | Performed by: NURSE PRACTITIONER

## 2025-04-15 PROCEDURE — 3074F SYST BP LT 130 MM HG: CPT | Performed by: NURSE PRACTITIONER

## 2025-04-15 PROCEDURE — 99213 OFFICE O/P EST LOW 20 MIN: CPT | Performed by: NURSE PRACTITIONER

## 2025-04-15 PROCEDURE — 1126F AMNT PAIN NOTED NONE PRSNT: CPT | Performed by: NURSE PRACTITIONER

## 2025-04-15 PROCEDURE — G2211 COMPLEX E/M VISIT ADD ON: HCPCS | Performed by: NURSE PRACTITIONER

## 2025-04-15 ASSESSMENT — PAIN SCALES - GENERAL: PAINLEVEL_OUTOF10: NO PAIN (0)

## 2025-04-15 NOTE — PROGRESS NOTES
Assessment & Plan     Morbid obesity (H)  - Weight loss of 26 lbs since starting medication in early February. Current dose of medication is 7.5 mg, which is effective. Patient is experiencing appetite suppression and reduced cravings, contributing to weight loss.  - Continue current dose of 7.5 mg. Recheck in three to six months, sooner with problems. Plan to send updates if weight loss plateaus or if an increase in dosage is desired. Continue strength training and dietary changes to support weight loss.  - tirzepatide-Weight Management (ZEPBOUND) 7.5 MG/0.5ML prefilled pen; Inject 0.5 mLs (7.5 mg) subcutaneously every 7 days.      Subjective   Saida is a 25 year old, presenting for the following health issues:  Recheck Medication and Health Maintenance        4/15/2025     5:24 PM   Additional Questions   Roomed by Mireille Nation CMA   Accompanied by self     History of Present Illness       Reason for visit:  3 month follow up - zepbound    She eats 2-3 servings of fruits and vegetables daily.She consumes 0 sweetened beverage(s) daily.She exercises with enough effort to increase her heart rate 20 to 29 minutes per day.  She exercises with enough effort to increase her heart rate 4 days per week.   She is taking medications regularly.        Medication Followup of  tirzepatide-Weight Management (ZEPBOUND) 7.5 MG/0.5ML prefilled pen   Taking Medication as prescribed: yes  Side Effects:  very seldom nausea   Medication Helping Symptoms:  yes    History of Present Illness-  - Saida Ibrahim, 25-year-old female.  - Lost 26 lbs since starting medication in early February 2025.  - Resting heart rate slightly elevated, but no negative impacts noticed.  - Experienced mild nausea, but not extreme.  - Reports appetite suppression and reduced cravings, particularly for sugary foods.  - Prioritizing protein intake.  - Engaged in consistent exercise, including daily walks and strength training twice a week.  - Previously  "experienced severe side effects when dosage was increased too quickly.    Wt Readings from Last 4 Encounters:   04/15/25 (!) 146.4 kg (322 lb 12.8 oz)   01/24/25 (!) 157.9 kg (348 lb 3.2 oz)   02/03/24 132.5 kg (292 lb)   11/09/22 (!) 163.1 kg (359 lb 9.6 oz)       Review of Systems  Constitutional, HEENT, cardiovascular, pulmonary, gi and gu systems are negative, except as otherwise noted.      Objective    /82 (BP Location: Right arm, Patient Position: Sitting, Cuff Size: Adult Large)   Pulse 86   Temp 98.4  F (36.9  C) (Tympanic)   Resp 16   Ht 1.772 m (5' 9.75\")   Wt (!) 146.4 kg (322 lb 12.8 oz)   LMP 04/01/2025   SpO2 98%   Breastfeeding No   BMI 46.65 kg/m    Body mass index is 46.65 kg/m .  Physical Exam   GENERAL: alert and no distress  NECK: no adenopathy, no asymmetry, masses, or scars, and thyroid normal to palpation  CV: regular rate and rhythm, normal S1 S2, no S3 or S4, no murmur, click or rub  PSYCH: mentation appears normal, affect normal/bright          Signed Electronically by: MARIELENA Shay CNP    "

## 2025-06-02 ENCOUNTER — VIRTUAL VISIT (OUTPATIENT)
Dept: FAMILY MEDICINE | Facility: CLINIC | Age: 25
End: 2025-06-02
Payer: COMMERCIAL

## 2025-06-02 DIAGNOSIS — E66.01 MORBID OBESITY (H): Primary | ICD-10-CM

## 2025-06-02 PROCEDURE — 98006 SYNCH AUDIO-VIDEO EST MOD 30: CPT | Performed by: NURSE PRACTITIONER

## 2025-06-02 RX ORDER — SEMAGLUTIDE 0.25 MG/.5ML
0.25 INJECTION, SOLUTION SUBCUTANEOUS WEEKLY
Qty: 2 ML | Refills: 0 | Status: SHIPPED | OUTPATIENT
Start: 2025-06-30 | End: 2025-07-28

## 2025-06-02 RX ORDER — SEMAGLUTIDE 1 MG/.5ML
1 INJECTION, SOLUTION SUBCUTANEOUS WEEKLY
Qty: 3 ML | Refills: 3 | Status: SHIPPED | OUTPATIENT
Start: 2025-08-01

## 2025-06-02 RX ORDER — SEMAGLUTIDE 0.5 MG/.5ML
0.5 INJECTION, SOLUTION SUBCUTANEOUS WEEKLY
Qty: 2 ML | Refills: 0 | Status: SHIPPED | OUTPATIENT
Start: 2025-06-30 | End: 2025-07-28

## 2025-06-02 NOTE — PROGRESS NOTES
Saida is a 25 year old who is being evaluated via a billable video visit.    How would you like to obtain your AVS? MyChart  If the video visit is dropped, the invitation should be resent by: Text to cell phone: [phone]  Will anyone else be joining your video visit? No    Assessment & Plan     Morbid obesity (H)  Doing well on the Zepbound down approximately 35 lbs which is great.  Insurance changing and needs to transition to Wegovy.  Plan to taper up on medication every 28 days until 1 mg and plan follow up in clinic at that time. Risks and benefits of medication discussed and written information provided.   - semaglutide-weight management (WEGOVY) 0.25 MG/0.5ML pen; Inject 0.5 mLs (0.25 mg) subcutaneously once a week for 28 days.  - Semaglutide-Weight Management (WEGOVY) 0.5 MG/0.5ML pen; Inject 0.5 mg subcutaneously once a week for 28 days.  - Semaglutide-Weight Management (WEGOVY) 1 MG/0.5ML pen; Inject 1 mg subcutaneously once a week.  - tirzepatide-Weight Management (ZEPBOUND) 10 MG/0.5ML prefilled pen; Inject 0.5 mLs (10 mg) subcutaneously every 7 days.    The longitudinal plan of care for the diagnosis(es)/condition(s) as documented were addressed during this visit. Due to the added complexity in care, I will continue to support Saida in the subsequent management and with ongoing continuity of care.      Subjective   Saida is a 25 year old, presenting for the following health issues:  Weight Check (Change medication due to insurance and increase dose)        6/2/2025    12:03 PM   Additional Questions   Roomed by Yue SPEARS   Accompanied by self         6/2/2025    12:03 PM   Patient Reported Additional Medications   Patient reports taking the following new medications no new meds     HPI          Objective           Vitals:  No vitals were obtained today due to virtual visit.    Physical Exam   GENERAL: alert and no distress  EYES: Eyes grossly normal to inspection.  No discharge or erythema, or obvious  scleral/conjunctival abnormalities.  RESP: No audible wheeze, cough, or visible cyanosis.    SKIN: Visible skin clear. No significant rash, abnormal pigmentation or lesions.  NEURO: Cranial nerves grossly intact.  Mentation and speech appropriate for age.  PSYCH: Appropriate affect, tone, and pace of words        Video-Visit Details    Type of service:  Video Visit   Originating Location (pt. Location): Home  Distant Location (provider location):  On-site  Platform used for Video Visit: Jacky  Signed Electronically by: MARIELENA Shay CNP

## 2025-06-02 NOTE — PATIENT INSTRUCTIONS
Plan to start the Wegovy - take 0.25 mg once weekly for 28 days, then increase to 0.5 mg every 7 days for 28 days, then increase to 1 mg every 7 days.  Plan follow up in clinic after on the 1 mg dose for a month.      [Today's Date] : [unfilled] [FreeTextEntry1] : NSR @ 64 bpm  ms, otherwise normal for age.

## 2025-06-24 DIAGNOSIS — E66.01 MORBID OBESITY (H): ICD-10-CM

## 2025-06-24 NOTE — TELEPHONE ENCOUNTER
Medication Question or Refill        What medication are you calling about (include dose and sig)?: Pending Prescriptions:                       Disp   Refills    semaglutide-weight management (WEGOVY) 0.*2 mL   0            Sig: Inject 0.5 mLs (0.25 mg) subcutaneously once a           week.         Preferred Pharmacy:   [facility] [address] [place] [address] [address] [place] [address] [facility] [address]  Phone: [phone] Fax: [phone]      Controlled Substance Agreement on file:   CSA -- Patient Level:    CSA: None found at the patient level.       Who prescribed the medication?: PCP: Misa Holt APRN CNP     Do you need a refill? Yes      Would we send this information to you in [facility] or would you prefer to receive a phone call?:   Patient would prefer a phone call   Okay to leave a detailed message?: Yes at Home number on file [phone] (home)    Yue Burroughs/ Patient

## 2025-06-25 RX ORDER — SEMAGLUTIDE 0.25 MG/.5ML
0.25 INJECTION, SOLUTION SUBCUTANEOUS WEEKLY
Qty: 2 ML | Refills: 0 | OUTPATIENT
Start: 2025-06-30

## 2025-07-15 DIAGNOSIS — E66.01 MORBID OBESITY (H): ICD-10-CM

## 2025-07-15 RX ORDER — SEMAGLUTIDE 0.25 MG/.5ML
INJECTION, SOLUTION SUBCUTANEOUS
Refills: 0 | OUTPATIENT
Start: 2025-07-15